# Patient Record
Sex: MALE | Race: WHITE | NOT HISPANIC OR LATINO | Employment: OTHER | URBAN - METROPOLITAN AREA
[De-identification: names, ages, dates, MRNs, and addresses within clinical notes are randomized per-mention and may not be internally consistent; named-entity substitution may affect disease eponyms.]

---

## 2023-10-13 PROBLEM — J32.9 RHINOSINUSITIS: Status: ACTIVE | Noted: 2023-10-13

## 2023-10-13 PROBLEM — J45.909 ASTHMA (HHS-HCC): Status: ACTIVE | Noted: 2023-10-13

## 2023-10-13 PROBLEM — I80.02 THROMBOPHLEBITIS OF SUPERFICIAL VEINS OF LEFT LOWER EXTREMITY: Status: ACTIVE | Noted: 2023-10-13

## 2023-10-13 PROBLEM — R22.9 SUBCUTANEOUS NODULES: Status: ACTIVE | Noted: 2023-10-13

## 2023-10-13 PROBLEM — J45.20 MILD INTERMITTENT ASTHMA WITHOUT COMPLICATION (HHS-HCC): Status: ACTIVE | Noted: 2023-10-13

## 2023-10-13 PROBLEM — E78.5 DYSLIPIDEMIA: Status: ACTIVE | Noted: 2023-10-13

## 2023-10-13 PROBLEM — L82.1 SEBORRHEIC KERATOSES: Status: ACTIVE | Noted: 2023-10-13

## 2023-10-13 PROBLEM — I83.93 VARICOSE VEINS OF LEGS: Status: ACTIVE | Noted: 2023-10-13

## 2023-10-13 PROBLEM — R35.0 URINARY FREQUENCY: Status: ACTIVE | Noted: 2023-10-13

## 2023-10-13 PROBLEM — N40.0 BPH (BENIGN PROSTATIC HYPERPLASIA): Status: ACTIVE | Noted: 2023-10-13

## 2023-10-13 PROBLEM — E55.9 VITAMIN D DEFICIENCY: Status: ACTIVE | Noted: 2023-10-13

## 2023-10-13 PROBLEM — N45.1 EPIDIDYMITIS: Status: ACTIVE | Noted: 2023-10-13

## 2023-10-13 PROBLEM — N52.9 ERECTILE DYSFUNCTION: Status: ACTIVE | Noted: 2023-10-13

## 2023-10-13 PROBLEM — R93.1 AGATSTON CORONARY ARTERY CALCIUM SCORE BETWEEN 200 AND 399: Status: ACTIVE | Noted: 2023-10-13

## 2023-10-13 RX ORDER — MULTIVIT-MINS/IRON/FOLIC/LYCOP 8-200-600
TABLET ORAL
COMMUNITY
Start: 2019-04-02

## 2023-10-13 RX ORDER — MOMETASONE FUROATE 50 UG/1
SPRAY, METERED NASAL
COMMUNITY

## 2023-10-13 RX ORDER — NAPROXEN 500 MG/1
1 TABLET ORAL 2 TIMES DAILY PRN
COMMUNITY
Start: 2022-05-23 | End: 2023-11-16 | Stop reason: ALTCHOICE

## 2023-10-13 RX ORDER — SENNOSIDES 8.6 MG/1
1 TABLET ORAL DAILY
COMMUNITY
Start: 2021-04-22 | End: 2023-11-16 | Stop reason: ALTCHOICE

## 2023-10-13 RX ORDER — CYCLOBENZAPRINE HCL 5 MG
1-2 TABLET ORAL EVERY 8 HOURS PRN
COMMUNITY
Start: 2022-05-23 | End: 2023-11-16 | Stop reason: ALTCHOICE

## 2023-10-13 RX ORDER — TADALAFIL 10 MG/1
1 TABLET ORAL AS NEEDED
COMMUNITY
Start: 2022-09-14 | End: 2023-10-16 | Stop reason: SDUPTHER

## 2023-10-16 ENCOUNTER — LAB (OUTPATIENT)
Dept: LAB | Facility: LAB | Age: 58
End: 2023-10-16
Payer: COMMERCIAL

## 2023-10-16 ENCOUNTER — OFFICE VISIT (OUTPATIENT)
Dept: UROLOGY | Facility: CLINIC | Age: 58
End: 2023-10-16
Payer: COMMERCIAL

## 2023-10-16 VITALS — HEIGHT: 72 IN | WEIGHT: 266.6 LBS | BODY MASS INDEX: 36.11 KG/M2 | TEMPERATURE: 97.3 F

## 2023-10-16 DIAGNOSIS — N40.0 BENIGN PROSTATIC HYPERPLASIA, UNSPECIFIED WHETHER LOWER URINARY TRACT SYMPTOMS PRESENT: ICD-10-CM

## 2023-10-16 DIAGNOSIS — N40.1 BPH WITH OBSTRUCTION/LOWER URINARY TRACT SYMPTOMS: Primary | ICD-10-CM

## 2023-10-16 DIAGNOSIS — N13.8 BPH WITH OBSTRUCTION/LOWER URINARY TRACT SYMPTOMS: ICD-10-CM

## 2023-10-16 DIAGNOSIS — N13.8 BPH WITH OBSTRUCTION/LOWER URINARY TRACT SYMPTOMS: Primary | ICD-10-CM

## 2023-10-16 DIAGNOSIS — N40.1 BPH WITH OBSTRUCTION/LOWER URINARY TRACT SYMPTOMS: ICD-10-CM

## 2023-10-16 LAB — PSA SERPL-MCNC: 0.44 NG/ML

## 2023-10-16 PROCEDURE — 84153 ASSAY OF PSA TOTAL: CPT

## 2023-10-16 PROCEDURE — 36415 COLL VENOUS BLD VENIPUNCTURE: CPT

## 2023-10-16 PROCEDURE — 99214 OFFICE O/P EST MOD 30 MIN: CPT | Performed by: UROLOGY

## 2023-10-16 PROCEDURE — 51741 ELECTRO-UROFLOWMETRY FIRST: CPT | Performed by: UROLOGY

## 2023-10-16 PROCEDURE — 51798 US URINE CAPACITY MEASURE: CPT | Performed by: UROLOGY

## 2023-10-16 RX ORDER — TADALAFIL 10 MG/1
10 TABLET ORAL AS NEEDED
Qty: 30 TABLET | Refills: 3 | Status: SHIPPED | OUTPATIENT
Start: 2023-10-16 | End: 2024-10-15

## 2023-10-16 NOTE — PROGRESS NOTES
Subjective   Dar Lee is a 58 y.o. with history of  history of erectile dysfunction on Tadalafil and BPH s/p PVP 04/22/21who presents today for follow up visit. Patient has no new urinary complaints. He is very satisfied with his urinary outcome s/p PVP. He denies flank pain, gross hematuria, kidney stones, and recurrent UTI.    Last PSA 0.5  Objective   Past Medical History:   Diagnosis Date    Body mass index (BMI) 39.0-39.9, adult 01/27/2020    Body mass index (BMI) of 39.0 to 39.9 in adult    Essential (primary) hypertension 04/02/2019    Benign essential hypertension    Nondisplaced fracture of head of left radius, subsequent encounter for closed fracture with routine healing 05/26/2016    Closed nondisplaced fracture of head of left radius with routine healing    Other conditions influencing health status     Prostatic Hyperplasia Without Urinary Obstruction With Other Lower Urinary Tract Symptoms    Pain in left elbow 05/26/2016    Left elbow pain     Past Surgical History:   Procedure Laterality Date    HEMORRHOID SURGERY  12/19/2013    Hemorrhoidectomy    OTHER SURGICAL HISTORY  12/19/2013    Excision Of Soft Tissue Tumor Of Back Subcutaneous 3cm Or Greater     Current Outpatient Medications on File Prior to Visit   Medication Sig Dispense Refill    mometasone (Nasonex) 50 mcg/actuation nasal spray Administer into affected nostril(s).      mv-min-folic-K1-lycopen-lutein (Men 50 Plus Multivitamin) 687--300 mcg tablet Take by mouth.      [DISCONTINUED] tadalafil (Cialis) 10 mg tablet Take 1 tablet (10 mg) by mouth if needed.      cyclobenzaprine (Flexeril) 5 mg tablet Take 1-2 tablets (5-10 mg) by mouth every 8 hours if needed.      naproxen (Naprosyn) 500 mg tablet Take 1 tablet (500 mg) by mouth 2 times a day as needed.      sennosides (Senokot) 8.6 mg tablet Take 1 tablet (8.6 mg) by mouth once daily.       No current facility-administered medications on file prior to visit.     No Known  Allergies    Temp 36.3 °C (97.3 °F) (Temporal)   Ht 1.829 m (6')   Wt 121 kg (266 lb 9.6 oz)   BMI 36.16 kg/m²   Physical Exam    Lab Review  Urine analysis shows negative Micro exam: clear  Lab Results   Component Value Date    PSA 0.53 09/14/2022     IPSS 0 and 0  PVR 0mL  Uroflow study demonstrated voided volume of 31 and maximal flow rate of 7 ml/s.     Assessment/Plan     1. BPH s/p PVP 04/22/2021    The patient is satisfied with his urinary outcome, we will follow up in 1 year or sooner if needed.       2. PSA monitoring     We will obtain a PSA blood test now and again in 1 year for continued monitoring.       3. ED     We will refill Tadalafil 10mg for 1 year.       Diagnoses and all orders for this visit:  BPH with obstruction/lower urinary tract symptoms  -     Urine flow measurement; Future  -     Prostate Specific Antigen; Future  -     Prostate Specific Antigen; Future  -     tadalafil (Cialis) 10 mg tablet; Take 1 tablet (10 mg) by mouth if needed for erectile dysfunction (Take 1 tablet 1 hour prior to activity as needed).  Benign prostatic hyperplasia, unspecified whether lower urinary tract symptoms present  -     Measure post void residual  -     tadalafil (Cialis) 10 mg tablet; Take 1 tablet (10 mg) by mouth if needed for erectile dysfunction (Take 1 tablet 1 hour prior to activity as needed).    All questions were answered to the patient's satisfaction. Patient agrees with the plan and wishes to proceed. Follow-up will be scheduled appropriately.       Scribed for Dr. Saldana by Yue Champion. I , Dr Saldana, have personally reviewed and agreed with the information entered by the Virtual Scribe.

## 2023-11-16 ENCOUNTER — OFFICE VISIT (OUTPATIENT)
Dept: PRIMARY CARE | Facility: CLINIC | Age: 58
End: 2023-11-16
Payer: COMMERCIAL

## 2023-11-16 VITALS
OXYGEN SATURATION: 95 % | HEART RATE: 90 BPM | BODY MASS INDEX: 36.48 KG/M2 | WEIGHT: 269 LBS | DIASTOLIC BLOOD PRESSURE: 78 MMHG | SYSTOLIC BLOOD PRESSURE: 128 MMHG

## 2023-11-16 DIAGNOSIS — Z13.1 SCREENING FOR DIABETES MELLITUS: ICD-10-CM

## 2023-11-16 DIAGNOSIS — R93.1 AGATSTON CORONARY ARTERY CALCIUM SCORE BETWEEN 200 AND 399: ICD-10-CM

## 2023-11-16 DIAGNOSIS — E55.9 VITAMIN D DEFICIENCY: ICD-10-CM

## 2023-11-16 DIAGNOSIS — K58.0 IRRITABLE BOWEL SYNDROME WITH DIARRHEA: Primary | ICD-10-CM

## 2023-11-16 DIAGNOSIS — M54.16 LUMBAR RADICULOPATHY: ICD-10-CM

## 2023-11-16 PROBLEM — N45.1 EPIDIDYMITIS: Status: RESOLVED | Noted: 2023-10-13 | Resolved: 2023-11-16

## 2023-11-16 PROBLEM — J32.9 RHINOSINUSITIS: Status: RESOLVED | Noted: 2023-10-13 | Resolved: 2023-11-16

## 2023-11-16 PROBLEM — J45.909 ASTHMA (HHS-HCC): Status: RESOLVED | Noted: 2023-10-13 | Resolved: 2023-11-16

## 2023-11-16 PROBLEM — K63.5 COLON POLYPS: Status: ACTIVE | Noted: 2023-11-16

## 2023-11-16 LAB
ALBUMIN SERPL BCP-MCNC: 4.5 G/DL (ref 3.4–5)
ALP SERPL-CCNC: 66 U/L (ref 33–120)
ALT SERPL W P-5'-P-CCNC: 39 U/L (ref 10–52)
ANION GAP SERPL CALC-SCNC: 13 MMOL/L (ref 10–20)
AST SERPL W P-5'-P-CCNC: 24 U/L (ref 9–39)
BASOPHILS # BLD AUTO: 0.04 X10*3/UL (ref 0–0.1)
BASOPHILS NFR BLD AUTO: 0.7 %
BILIRUB SERPL-MCNC: 0.4 MG/DL (ref 0–1.2)
BUN SERPL-MCNC: 19 MG/DL (ref 6–23)
CALCIUM SERPL-MCNC: 9.7 MG/DL (ref 8.6–10.3)
CHLORIDE SERPL-SCNC: 102 MMOL/L (ref 98–107)
CHOLEST SERPL-MCNC: 193 MG/DL (ref 0–199)
CHOLESTEROL/HDL RATIO: 5.6
CO2 SERPL-SCNC: 31 MMOL/L (ref 21–32)
CREAT SERPL-MCNC: 0.75 MG/DL (ref 0.5–1.3)
EOSINOPHIL # BLD AUTO: 0.17 X10*3/UL (ref 0–0.7)
EOSINOPHIL NFR BLD AUTO: 3 %
ERYTHROCYTE [DISTWIDTH] IN BLOOD BY AUTOMATED COUNT: 12.6 % (ref 11.5–14.5)
GFR SERPL CREATININE-BSD FRML MDRD: >90 ML/MIN/1.73M*2
GLUCOSE SERPL-MCNC: 99 MG/DL (ref 74–99)
HCT VFR BLD AUTO: 45.6 % (ref 41–52)
HDLC SERPL-MCNC: 34.5 MG/DL
HGB BLD-MCNC: 14.7 G/DL (ref 13.5–17.5)
IMM GRANULOCYTES # BLD AUTO: 0.02 X10*3/UL (ref 0–0.7)
IMM GRANULOCYTES NFR BLD AUTO: 0.4 % (ref 0–0.9)
LDLC SERPL CALC-MCNC: 127 MG/DL
LYMPHOCYTES # BLD AUTO: 1.16 X10*3/UL (ref 1.2–4.8)
LYMPHOCYTES NFR BLD AUTO: 20.8 %
MCH RBC QN AUTO: 28.5 PG (ref 26–34)
MCHC RBC AUTO-ENTMCNC: 32.2 G/DL (ref 32–36)
MCV RBC AUTO: 89 FL (ref 80–100)
MONOCYTES # BLD AUTO: 0.42 X10*3/UL (ref 0.1–1)
MONOCYTES NFR BLD AUTO: 7.5 %
NEUTROPHILS # BLD AUTO: 3.78 X10*3/UL (ref 1.2–7.7)
NEUTROPHILS NFR BLD AUTO: 67.6 %
NON HDL CHOLESTEROL: 159 MG/DL (ref 0–149)
NRBC BLD-RTO: 0 /100 WBCS (ref 0–0)
PLATELET # BLD AUTO: 254 X10*3/UL (ref 150–450)
POTASSIUM SERPL-SCNC: 4.3 MMOL/L (ref 3.5–5.3)
PROT SERPL-MCNC: 6.6 G/DL (ref 6.4–8.2)
RBC # BLD AUTO: 5.15 X10*6/UL (ref 4.5–5.9)
SODIUM SERPL-SCNC: 142 MMOL/L (ref 136–145)
TRIGL SERPL-MCNC: 160 MG/DL (ref 0–149)
TSH SERPL-ACNC: 1.63 MIU/L (ref 0.44–3.98)
VLDL: 32 MG/DL (ref 0–40)
WBC # BLD AUTO: 5.6 X10*3/UL (ref 4.4–11.3)

## 2023-11-16 PROCEDURE — 85025 COMPLETE CBC W/AUTO DIFF WBC: CPT

## 2023-11-16 PROCEDURE — 84443 ASSAY THYROID STIM HORMONE: CPT

## 2023-11-16 PROCEDURE — 82306 VITAMIN D 25 HYDROXY: CPT

## 2023-11-16 PROCEDURE — 36415 COLL VENOUS BLD VENIPUNCTURE: CPT

## 2023-11-16 PROCEDURE — 86431 RHEUMATOID FACTOR QUANT: CPT

## 2023-11-16 PROCEDURE — 1036F TOBACCO NON-USER: CPT | Performed by: FAMILY MEDICINE

## 2023-11-16 PROCEDURE — 80061 LIPID PANEL: CPT

## 2023-11-16 PROCEDURE — 99214 OFFICE O/P EST MOD 30 MIN: CPT | Performed by: FAMILY MEDICINE

## 2023-11-16 PROCEDURE — 83036 HEMOGLOBIN GLYCOSYLATED A1C: CPT

## 2023-11-16 PROCEDURE — 80053 COMPREHEN METABOLIC PANEL: CPT

## 2023-11-16 RX ORDER — ALBUTEROL SULFATE 90 UG/1
2 AEROSOL, METERED RESPIRATORY (INHALATION) EVERY 6 HOURS PRN
COMMUNITY

## 2023-11-16 RX ORDER — MOMETASONE FUROATE 50 UG/1
2 SPRAY, METERED NASAL DAILY
COMMUNITY
End: 2023-11-16 | Stop reason: SDUPTHER

## 2023-11-16 RX ORDER — CYCLOBENZAPRINE HCL 5 MG
5-10 TABLET ORAL EVERY 8 HOURS PRN
Qty: 60 TABLET | Refills: 2 | Status: SHIPPED | OUTPATIENT
Start: 2023-11-16

## 2023-11-16 RX ORDER — CHOLECALCIFEROL (VITAMIN D3) 25 MCG
1000 TABLET ORAL DAILY
COMMUNITY

## 2023-11-16 RX ORDER — NAPROXEN 500 MG/1
500 TABLET ORAL 2 TIMES DAILY PRN
Qty: 60 TABLET | Refills: 3 | Status: SHIPPED | OUTPATIENT
Start: 2023-11-16

## 2023-11-16 NOTE — PATIENT INSTRUCTIONS
"Take Enteric Coated 81 mg Aspirin daily - after breakfast -     The Naproxen for pain -  can be taken twice a day as needed - after food -   But has to be taken a few hours after the aspirin     Can take the cyclobenzaprine 5 mg -  up to three times a day for muscle spasm   - can make you tired      Consider follow up with ortho       Call [phone] to set up CT scan   If the number is moderate or high -  I will be sending in the RX for the cholesterol medication     I will mail you out your test results - if you do not hear from anyone in 2 weeks - call me .     [facility]  -  [phone] [facility] -  Cheryl     For General Healthy Nutrition    (Remember - NOT A DIET!   Diets are only good for class reunions.)    These are my general good nutrition recommendations for most people.   I use the term \" diet \"  in these instructions to mean your overall nutrition - how you eat and drink.   If we talked about something different during your visit with me,  other than what is written below,  follow that advice instead.       For most people,  eating healthier means getting less added sugar and less processed foods in your diet    The fresher the better.    Added sugar is now a part of the nutrition label on manufactured food, so you can keep an eye on it easier.    But basically,  foods and beverages  that contain regular sugar and corn syrup are the main sources of added sugars.  Eating as little of these foods as you can is best.   One shocking example of the epidemic of added sugar is soda.    One can of regular soda contains about as much added sugar as 3 regular size doughnuts!     The other issue with processed foods is the amount of processed grains they contain , such as white flour.    This is also something you want to try to limit in your diet.     But, grain products are very important for your nutrition.    Whole grains are better for your body.     Cutting back on white breads, traditional pasta, baked goods, " white rice,  and processed cereals will be healthier for you.   The better choices include whole grain breads,  whole wheat pasta,  brown rice, quinoa, barley, steel cut  or rolled oats.   If you eat cereal for breakfast, try to look for one made with whole grains and less sugar.   There are many people who have a problem with gluten, for a large variety of reasons.    Generally,  products made with wheat flour , barley or rye are the primary source of gluten.       Cutting back on saturated fats is important.    You want the majority of the meat that you eat to be chicken, fish or turkey.   Baked or broiled is best -  fried adds too much fat.    There are healthy fats that are important - fat is important for holding down appetite, vitamin absorption and several metabolic processes in the body.  Monounsaturated fats raise HDL (good cholesterol) and lower LDL (bad cholesterol).   Olive oil, peanut oil, nuts, seeds, and avocados are great sources of the good fats.       Ideas are:   Trade sour cream dip for hummus (which is rich in olive oil) or guacamole; use veggies or whole-wheat chips to dip.    Nuts are an excellent source of protein and healthy fats.   Tree nuts are the best kind, such as almonds or walnuts.   Just be careful - they are high in calories, so stick to a serving size.  (Most are about 200 calories for a 1/4 cup)      Proteins are very important for your body, and they also hold down your appetite.   Try to have protein with every meal.    These generally are meats, nuts, many beans, legumes, eggs, and dairy.   You will find protein in whole grain products and some green vegetables have a little too.     When you have dairy (if you can - many people are lactose intolerant) try to make it low fat.    Ideas are 1% milk, lowfat yogurt or cheeses, low fat cottage cheese.   I don't generally recommend FAT FREE because they often contain artificial products to improve taste, and the fat helps hold down  "your appetite.   If you are lactose intolerant, try to see if taking Lactaid before having dairy helps.      Fresh fruits and vegetables are VERY important.  The brighter the better.   Many vegetables are considered \"Free Foods\" - meaning you can eat as much as you want, and it does not matter.  These include tomatoes, cucumbers, celery, peppers, all the various lettuces and kale - to name a few.   Potatoes, corn and peas are starchy, so do have more calories, but are still healthy - you just want to watch the amount of them you eat.       Fruits are full of wonderful nutrition.   They contain natural sugar called fructose, so eating them in moderation is best.   Diabetics may need to pay careful attention to how their body reacts to the sugar.  Some fruits might drastically increase their blood sugar.      Eating smaller meals with a couple of small snacks is better for your metabolism than not eating for long amounts of time  (breakfast is very important).   Trying to avoid large meals is helpful too.    Eating like this helps keep your appetite down and keeps you in burning metabolism rather than storage metabolism so your body will use the calories you eat.       I do not tell people to stop eating sweets or snack foods - just limit the amounts you have.  The less the better.   Pay attention to serving sizes, and treat them as a treat.        Foods like doughnuts, pop tarts, sugar cereals, cookies  ARE NOT GOOD FOR BREAKFAST.   They are loaded with sugar and will cause you to be hungrier in the day and often not feel well.    Caffeine needs to be limited - no more than 2 servings a day.  Some people can't have any at all.    (if you have any sleep or anxiety issues - stop the caffeine)   Coffee, many teas, many sodas, energy drinks, almost any diet supplement,  and chocolate all contain caffeine.      Water is important.   For most people, 8   x  8 ounces  a day are needed.  This may vary for some health " issues.    If you need to be on a low sodium diet, that means looking at labels and eating only 1000 - 2000 mg of sodium a day.    Calcium intake is important.  3 servings of a high calcium food or drink a day is recommended.   This is usually a cup of milk, a cup of yogurt, an ounce of a hard cheese or 1.5 ounces of a soft cheese are the usual servings.   There are other high calcium foods - including soy or almond milk, broccoli,  almonds, dark green leafy vegetable.   Make sure you are not getting more than 1000  - 1200 mg of total calcium a day (unless you have been told you need more by a doctor).    Vitamin D 3 is important to absorb the calcium and for your immune system.   For children, 400 IU a day is recommended.   For adults - 800 - 5000 IU a day  is recommended.  (Often the amount needed is individualized for adults - be sure to ask how much is right for you)    Physical activity is very important for good health.    Finding activities that give you regular exercise is very important for good health.  Try to find exercise you enjoy doing on a regular basis.    30 minutes at least 5 days a week of a good cardiovascular exercise is recommended.   That means something that gets your heart rate going faster than your usual baseline and you can find yourself breathing harder than usual while you are exercising.  If you have not done any exercise in a long time,  make sure you ask if its safe for you to start,  and be sure to gradually work up to your goal.      If you need to lose weight,  following these recommendations will help you.   And if you are doing all of this and still not losing weight, then its likely just the amount of food you are eating.   Learn to cut back on portion sizes.  Using smaller plates may help.  Healthy weight loss is  only about a pound a week.   You have to remember that whatever you do to lose the weight, you must be prepared to keep it up for life for the weight to stay off.      A lot of people have a lot of luck with using something like a fit bit,  or a program where you keep track of all of your calories that you eat and what you burn off in the day.

## 2023-11-16 NOTE — PROGRESS NOTES
Subjective   Patient ID: Dar Lee is a 58 y.o. male who presents for Follow-up (Wants to discuss getting some blood work done.).    HPI     LOV over a year ago -   A few issues to go over       Colon polyps -  3/2023 last scope -   Tubular adenoma     When he is nervous - has several BM s   They are fine if he is not nervous   3 - 4 days a week     Does tend to anxiety  - tries not to worry   Caffeine - 6 cups a day  (cutting back)       BPH /ED -  sees DR Saldana yearly   Prostate surgery 4/2021  (BPV)  No meds -   Last PSA less than one   Takes tadalafil prn only   No issues urinating     Having a lot of back pain   Seeing [facility] -   Had Xray and EMG study - that did show lumbar nerve compressions -   Did not go back to the doctor   Tried their DRX machine -   Has his own inversion table - loves how it makes him feel   Very stiff in the AM -  1.5 hours to loosen up   He does have exercises - those help him to feel better  Would like medications to help too  Had naproxen and cyclobenz in the past - not sure what happened with them     Right elbow pain and swelling about 6 months   - does to repetative work      Hx of Moderately high Cor CT score  5/2018 -   Never took the cholesterol medication   Not taking ASA either   No hx of MI or CVA     Sees an herbalist in [place]  - wants labs done     Use to see John years ago -   Wonders about RA    Many skin tags to remove     Declines vaccines       Review of Systems    Objective   /78 (BP Location: Right arm, Patient Position: Sitting, BP Cuff Size: Large adult)   Pulse 90   Wt 122 kg (269 lb)   SpO2 95%   BMI 36.48 kg/m²     Physical Exam  Vitals reviewed.   Constitutional:       General: He is not in acute distress.     Appearance: Normal appearance. He is obese. He is not ill-appearing, toxic-appearing or diaphoretic.   HENT:      Head: Normocephalic and atraumatic.      Right Ear: External ear normal.      Left Ear: External ear normal.       Nose: Nose normal.      Mouth/Throat:      Mouth: Mucous membranes are moist.      Pharynx: No posterior oropharyngeal erythema.   Eyes:      General: No scleral icterus.     Extraocular Movements: Extraocular movements intact.      Pupils: Pupils are equal, round, and reactive to light.   Cardiovascular:      Rate and Rhythm: Normal rate and regular rhythm.      Pulses: Normal pulses.      Heart sounds: No murmur heard.  Pulmonary:      Effort: Pulmonary effort is normal. No respiratory distress.      Breath sounds: Normal breath sounds. No wheezing.   Abdominal:      General: Bowel sounds are normal. There is no distension.      Palpations: Abdomen is soft.      Tenderness: There is no abdominal tenderness.   Musculoskeletal:         General: Normal range of motion.      Cervical back: Neck supple. No rigidity.      Right lower leg: No edema.      Left lower leg: No edema.      Comments: Tender over right lateral epicondyle    Lymphadenopathy:      Cervical: No cervical adenopathy.   Skin:     General: Skin is warm and dry.      Findings: No rash.      Comments: Several skin tags around neck and upper chest    Neurological:      General: No focal deficit present.      Mental Status: He is alert and oriented to person, place, and time.   Psychiatric:         Mood and Affect: Mood normal.         Thought Content: Thought content normal.         Assessment/Plan   Problem List Items Addressed This Visit             ICD-10-CM       High    Agatston coronary artery calcium score between 200 and 399 R93.1    Relevant Orders    CT cardiac scoring wo IV contrast    Comprehensive Metabolic Panel    Lipid Panel    CBC and Auto Differential    TSH with reflex to Free T4 if abnormal    Irritable bowel syndrome with diarrhea - Primary K58.0       Medium    Vitamin D deficiency E55.9    Relevant Orders    Vitamin D 25-Hydroxy,Total (for eval of Vitamin D levels)    Lumbar radiculopathy M54.16    Relevant Medications     cyclobenzaprine (Flexeril) 5 mg tablet    naproxen (Naprosyn) 500 mg tablet    Other Relevant Orders    Rheumatoid Factor     Other Visit Diagnoses         Codes    Screening for diabetes mellitus     Z13.1    Relevant Orders    Hemoglobin A1C          58 year old man  - issues as above    Discussed the likely IBS-D -   Discussed possible paroxetine to help -   He wants to first try less coffee    Discussed his back issues -   Urged to follow up with ortho -   And refilled the naproxen and cyclobenz -   But told to take asa first in the AM and always take the ASA and naproxen with food     Elbow seems to be epicondylitis     Moderate Cor Score on CT - agreed to check again     Labs today     Urged seeing derm for skin tags     We discussed at visit any disease processes that were of concern as well as the risks, benefits and instructions of any new medication provided.    See orders and discussion section for information handed to patient on their Clinical Summary.   Patient (and/or caretaker of patient if present)  stated all questions were answered, and they voiced understanding of instructions.

## 2023-11-17 LAB
25(OH)D3 SERPL-MCNC: 75 NG/ML (ref 30–100)
EST. AVERAGE GLUCOSE BLD GHB EST-MCNC: 114 MG/DL
HBA1C MFR BLD: 5.6 %
RHEUMATOID FACT SER NEPH-ACNC: <10 IU/ML (ref 0–15)

## 2023-11-20 ENCOUNTER — TELEPHONE (OUTPATIENT)
Dept: PRIMARY CARE | Facility: CLINIC | Age: 58
End: 2023-11-20
Payer: COMMERCIAL

## 2024-05-07 ENCOUNTER — TELEPHONE (OUTPATIENT)
Dept: PRIMARY CARE | Facility: CLINIC | Age: 59
End: 2024-05-07
Payer: COMMERCIAL

## 2024-05-07 NOTE — TELEPHONE ENCOUNTER
Patient is asking for something for pain. He was wondering about Gabapentin    S/w Jennifer Alan, stated that pts BP was 92/60, he is tired and weak with hematuria  Jennifer Alan states that the blood is a dark holly red color  Stated that his SP02 and HR is fluctuating from Spo2 84% HR 50- SP02 100%  Advised to go to the ER  Verbally understood

## 2024-08-22 ENCOUNTER — APPOINTMENT (OUTPATIENT)
Dept: PRIMARY CARE | Facility: CLINIC | Age: 59
End: 2024-08-22
Payer: COMMERCIAL

## 2024-08-22 VITALS
BODY MASS INDEX: 36.89 KG/M2 | OXYGEN SATURATION: 99 % | SYSTOLIC BLOOD PRESSURE: 155 MMHG | HEART RATE: 89 BPM | WEIGHT: 272 LBS | DIASTOLIC BLOOD PRESSURE: 93 MMHG

## 2024-08-22 DIAGNOSIS — R93.1 AGATSTON CORONARY ARTERY CALCIUM SCORE BETWEEN 200 AND 399: ICD-10-CM

## 2024-08-22 DIAGNOSIS — L05.91 PILONIDAL CYST: Primary | ICD-10-CM

## 2024-08-22 PROCEDURE — 1036F TOBACCO NON-USER: CPT

## 2024-08-22 PROCEDURE — 99213 OFFICE O/P EST LOW 20 MIN: CPT

## 2024-08-22 RX ORDER — SULFAMETHOXAZOLE AND TRIMETHOPRIM 800; 160 MG/1; MG/1
1 TABLET ORAL 2 TIMES DAILY
Qty: 20 TABLET | Refills: 0 | Status: SHIPPED | OUTPATIENT
Start: 2024-08-22 | End: 2024-09-01

## 2024-08-22 ASSESSMENT — PATIENT HEALTH QUESTIONNAIRE - PHQ9
SUM OF ALL RESPONSES TO PHQ9 QUESTIONS 1 AND 2: 0
2. FEELING DOWN, DEPRESSED OR HOPELESS: NOT AT ALL
1. LITTLE INTEREST OR PLEASURE IN DOING THINGS: NOT AT ALL

## 2024-08-22 NOTE — PROGRESS NOTES
Subjective   Patient ID: Dar Lee is a 58 y.o. male who presents for possible pilonidal cyst on tailbone (Per pt cyst is draining, needs order for CT cardiac score test).  HPI  Dar presents for a cyst on his tailbone   He states that it has been there for over a month, it has been draining for that long as well   He has tried to put a salve on it, it has not helped  He states there is a little opening that the drainage comes out of, the drainage is a reddish color  He states that it does not hurt but he can feel it when he is sitting     He states that several years ago, his wife noticed a little cyst with a hole in the same area and she actually pulled out hair follicles from the area    Has irritable bowel syndrome   Will take 1-2 anti-diarrheals when needed, this is just a couple times a month, not weekly      Past Surgical History:   Procedure Laterality Date    HEMORRHOID SURGERY  12/19/2013    Hemorrhoidectomy    OTHER SURGICAL HISTORY  12/19/2013    Excision Of Soft Tissue Tumor Of Back Subcutaneous 3cm Or Greater      Past Medical History:   Diagnosis Date    Body mass index (BMI) 39.0-39.9, adult 01/27/2020    Body mass index (BMI) of 39.0 to 39.9 in adult    Essential (primary) hypertension 04/02/2019    Benign essential hypertension    Nondisplaced fracture of head of left radius, subsequent encounter for closed fracture with routine healing 05/26/2016    Closed nondisplaced fracture of head of left radius with routine healing    Other conditions influencing health status     Prostatic Hyperplasia Without Urinary Obstruction With Other Lower Urinary Tract Symptoms    Pain in left elbow 05/26/2016    Left elbow pain     Social History     Tobacco Use    Smoking status: Former     Types: Cigarettes    Smokeless tobacco: Never   Vaping Use    Vaping status: Never Used   Substance Use Topics    Alcohol use: Never    Drug use: Never        Review of Systems  10 point review of systems performed and is  negative except as noted in the HPI.      Current Outpatient Medications:     cholecalciferol (Vitamin D3) 25 MCG (1000 UT) tablet, Take 1 tablet (1,000 Units) by mouth once daily., Disp: , Rfl:     cyclobenzaprine (Flexeril) 5 mg tablet, Take 1-2 tablets (5-10 mg) by mouth every 8 hours if needed for muscle spasms., Disp: 60 tablet, Rfl: 2    mometasone (Nasonex) 50 mcg/actuation nasal spray, Administer into affected nostril(s)., Disp: , Rfl:     naproxen (Naprosyn) 500 mg tablet, Take 1 tablet (500 mg) by mouth 2 times a day as needed for moderate pain (4 - 6)., Disp: 60 tablet, Rfl: 3    tadalafil (Cialis) 10 mg tablet, Take 1 tablet (10 mg) by mouth if needed for erectile dysfunction (Take 1 tablet 1 hour prior to activity as needed)., Disp: 30 tablet, Rfl: 3    albuterol 90 mcg/actuation inhaler, Inhale 2 puffs every 6 hours if needed for wheezing., Disp: , Rfl:     mv-min-folic-K1-lycopen-lutein (Men 50 Plus Multivitamin) 841--300 mcg tablet, Take by mouth., Disp: , Rfl:     sulfamethoxazole-trimethoprim (Bactrim DS) 800-160 mg tablet, Take 1 tablet by mouth 2 times a day for 10 days., Disp: 20 tablet, Rfl: 0     Objective   BP (!) 155/93   Pulse 89   Wt 123 kg (272 lb)   SpO2 99%   BMI 36.89 kg/m²     Physical Exam  Constitutional:       Appearance: Normal appearance. He is obese.   HENT:      Head: Normocephalic and atraumatic.   Skin:     General: Skin is warm and dry.      Comments: Within the gluteal cleft is a small fluctuant mass. No drainage is expressed from the area. Mild erythema surrounding the area. Multiple pores seen in the area.    Neurological:      Mental Status: He is alert.         Assessment/Plan   Problem List Items Addressed This Visit       Agatston coronary artery calcium score between 200 and 399    Relevant Orders    CT cardiac scoring wo IV contrast     Other Visit Diagnoses       Pilonidal cyst    -  Primary    Relevant Medications    sulfamethoxazole-trimethoprim  (Bactrim DS) 800-160 mg tablet    Other Relevant Orders    Referral to General Surgery          Pilonidal cyst   Referral to general surgery   Start bactrim due to drainage color  Case discussed with Dr. Trejo     Discussed at visit any disease processes that were of concern as well as the risks, benefits and instructions on any new medication provided. Patient (and/or caretaker of patient if present) stated all questions were answered, and they voiced understanding of instructions.

## 2024-08-22 NOTE — PATIENT INSTRUCTIONS
[facility]     Dr Braun - [phone]    Dr Ayala - [phone]    Start bactrim twice a day for 10 days

## 2024-08-28 ENCOUNTER — APPOINTMENT (OUTPATIENT)
Dept: SURGERY | Facility: CLINIC | Age: 59
End: 2024-08-28
Payer: COMMERCIAL

## 2024-08-28 VITALS
DIASTOLIC BLOOD PRESSURE: 93 MMHG | BODY MASS INDEX: 36.55 KG/M2 | HEART RATE: 101 BPM | SYSTOLIC BLOOD PRESSURE: 151 MMHG | OXYGEN SATURATION: 96 % | WEIGHT: 269.5 LBS

## 2024-08-28 DIAGNOSIS — L05.91 PILONIDAL CYST: Primary | ICD-10-CM

## 2024-08-28 DIAGNOSIS — L05.91 PILONIDAL CYST: ICD-10-CM

## 2024-08-28 PROCEDURE — 99203 OFFICE O/P NEW LOW 30 MIN: CPT | Performed by: SURGERY

## 2024-08-28 NOTE — PROGRESS NOTES
General Surgery History and Physical    Referring Provider:  MD Mark Brush Marina, PA-C     Chief Complaint:  Pilonidal cyst    History of Present Illness:  This is a 58 y.o. male who presents with a pilonidal cyst.  He reports that for 2 years he has had intermittent draining from his gluteal cleft.  He reports that intermittently his wife will help him drain the cyst and pull out strands of hair.  He denies any prior infections.   He denies any significant episodes of pain.    Past Medical History:  Obesity    Past Surgical History:  Hemorrhoidectomy  Subcutaneous cyst excision  Colonoscopy    Medications:  Albuterol  Flexeril  Naproxen  Bactrim  Cialis    Allergies:  No known drug allergies    Family History:  Asthma    Social History:  .  He is [place].  He works in a [employer] business.  Denies alcohol and illicits.  Former smoker.       Review of Systems:  A complete 12 point review of systems was performed and is negative except as noted in the history of present illness.      Vital Signs:  Vitals:    08/28/24 0837   BP: (!) 151/93   Pulse: 101   SpO2: 96%          Physical Exam:  General: No acute distress. Sitting up in bed.   Neuro: Alert and oriented ×3. Follows commands.  Head: Atraumatic  Eyes: Pupils equal reactive to light. Extraocular motions intact.  Ears: Hears normal speaking voice.  Mouth, Nose, Throat: Mucous membranes moist.  Normal dentition.  Neck: Supple. No appreciable masses.  Chest: No crepitus.  No appreciable scars.  Heart: Regular rate and rhythm.  Lung: Clear to auscultation bilaterally.  Vascular: No carotid bruits.  Palpable radial pulses bilaterally.  Abdomen: Soft. Nondistended. Nontender.    Rectal: Pilonidal cyst  Musculoskeletal: Moves all extremities.  Normal range of motion.  Lymphatic: No palpable lymph nodes.  Skin: No rashes or lesions.  Psychological: Normal affect      Laboratory Values:  None      Imaging:     None      Assessment:  This is a 58 y.o. male who presents with a 2-year standing pilonidal cyst.  We discussed that given it has been symptomatic for 2 years, I do recommend surgical incision and debridement with packing.      Plan:  -- Plan for incision and debridement with packing of a pilonidal cyst      Didier Braun MD  General Surgery  Office: [phone]  Fax:     [phone]  8:37 AM   08/28/24      Vital Signs (24 Hrs):  T(C): 36.9 (09-23-20 @ 13:30), Max: 39.7 (09-23-20 @ 10:30)  HR: 75 (09-23-20 @ 16:12) (72 - 96)  BP: 99/75 (09-23-20 @ 16:12) (88/54 - 153/102)  RR: 18 (09-23-20 @ 16:12) (18 - 24)  SpO2: 100% (09-23-20 @ 16:12) (97% - 100%)  Wt(kg): --  Daily Height in cm: 165.1 (23 Sep 2020 10:30)    Daily     I&O's Summary    GENERAL: no acute distress; well-developed  HEAD:  Atraumatic, Normocephalic  EYES: EOMI, PERRLA, conjunctiva and sclera clear, + exophthalmos OD  ENT: MMM; oropharynx clear  NECK: Supple, No JVD  CHEST/LUNG: Clear to auscultation bilaterally; No wheeze  HEART: Irregular rate, normal S1S2, no murmurs, rubs, gallops  ABDOMEN: Soft, Nontender, Nondistended; Bowel sounds present  EXTREMITIES:  2+ Peripheral Pulses, No clubbing, cyanosis, or edema  PSYCH: AAOx3  NEUROLOGY: no focal motor or sensory deficits. 5/5 muscle strength in all extremities.   SKIN: chronic discoloration noted on back; no rashes.

## 2024-09-06 ENCOUNTER — APPOINTMENT (OUTPATIENT)
Dept: PRIMARY CARE | Facility: CLINIC | Age: 59
End: 2024-09-06
Payer: COMMERCIAL

## 2024-09-06 VITALS
WEIGHT: 272.2 LBS | DIASTOLIC BLOOD PRESSURE: 86 MMHG | BODY MASS INDEX: 36.92 KG/M2 | SYSTOLIC BLOOD PRESSURE: 126 MMHG | HEART RATE: 82 BPM | OXYGEN SATURATION: 95 %

## 2024-09-06 DIAGNOSIS — L91.8 CUTANEOUS SKIN TAGS: Primary | ICD-10-CM

## 2024-09-06 PROCEDURE — 17000 DESTRUCT PREMALG LESION: CPT | Performed by: FAMILY MEDICINE

## 2024-09-06 ASSESSMENT — PATIENT HEALTH QUESTIONNAIRE - PHQ9
2. FEELING DOWN, DEPRESSED OR HOPELESS: NOT AT ALL
1. LITTLE INTEREST OR PLEASURE IN DOING THINGS: NOT AT ALL
SUM OF ALL RESPONSES TO PHQ9 QUESTIONS 1 AND 2: 0

## 2024-09-06 NOTE — PROGRESS NOTES
Subjective   Patient ID: Dar Lee is a 58 y.o. male who presents for skin tags (Neck ).  HPI  Has skin tags on both sides of neck  They get caught on things  Some pain  No bleeding    Current Outpatient Medications:     albuterol 90 mcg/actuation inhaler, Inhale 2 puffs every 6 hours if needed for wheezing., Disp: , Rfl:     cholecalciferol (Vitamin D3) 25 MCG (1000 UT) tablet, Take 1 tablet (1,000 Units) by mouth once daily., Disp: , Rfl:     cyclobenzaprine (Flexeril) 5 mg tablet, Take 1-2 tablets (5-10 mg) by mouth every 8 hours if needed for muscle spasms., Disp: 60 tablet, Rfl: 2    mometasone (Nasonex) 50 mcg/actuation nasal spray, Administer into affected nostril(s)., Disp: , Rfl:     mv-min-folic-K1-lycopen-lutein (Men 50 Plus Multivitamin) 134--300 mcg tablet, Take by mouth., Disp: , Rfl:     naproxen (Naprosyn) 500 mg tablet, Take 1 tablet (500 mg) by mouth 2 times a day as needed for moderate pain (4 - 6)., Disp: 60 tablet, Rfl: 3    tadalafil (Cialis) 10 mg tablet, Take 1 tablet (10 mg) by mouth if needed for erectile dysfunction (Take 1 tablet 1 hour prior to activity as needed)., Disp: 30 tablet, Rfl: 3   Past Surgical History:   Procedure Laterality Date    HEMORRHOID SURGERY  12/19/2013    Hemorrhoidectomy    OTHER SURGICAL HISTORY  12/19/2013    Excision Of Soft Tissue Tumor Of Back Subcutaneous 3cm Or Greater      Past Medical History:   Diagnosis Date    Body mass index (BMI) 39.0-39.9, adult 01/27/2020    Body mass index (BMI) of 39.0 to 39.9 in adult    Essential (primary) hypertension 04/02/2019    Benign essential hypertension    Nondisplaced fracture of head of left radius, subsequent encounter for closed fracture with routine healing 05/26/2016    Closed nondisplaced fracture of head of left radius with routine healing    Other conditions influencing health status     Prostatic Hyperplasia Without Urinary Obstruction With Other Lower Urinary Tract Symptoms    Pain in left elbow  05/26/2016    Left elbow pain     Social History     Tobacco Use    Smoking status: Former     Types: Cigarettes    Smokeless tobacco: Never   Vaping Use    Vaping status: Never Used   Substance Use Topics    Alcohol use: Never    Drug use: Never      Family History   Problem Relation Name Age of Onset    Asthma Son        Review of Systems    Objective   /86   Pulse 82   Wt 123 kg (272 lb 3.2 oz)   SpO2 95%   BMI 36.92 kg/m²    Physical Exam  Multiple skin tags around his neck  No erythema, bleeding or discharge    Assessment/Plan   Problem List Items Addressed This Visit    None  Visit Diagnoses       Cutaneous skin tags    -  Primary        Got informed consent to remove them- clean areas with isopropyl alcohol. Patient elected no numbing injections- used forceps to raise them and scissors to clip at the bases. Bandages placed over areas. Told to gently wash the area tonight with soap and water. 13 clipped    Patient understands and agrees with treatment plan    Akshat Angel, DO

## 2024-10-10 ENCOUNTER — ANESTHESIA EVENT (OUTPATIENT)
Dept: OPERATING ROOM | Facility: HOSPITAL | Age: 59
End: 2024-10-10
Payer: COMMERCIAL

## 2024-10-15 ENCOUNTER — ANESTHESIA (OUTPATIENT)
Dept: OPERATING ROOM | Facility: HOSPITAL | Age: 59
End: 2024-10-15
Payer: COMMERCIAL

## 2024-10-15 ENCOUNTER — HOSPITAL ENCOUNTER (OUTPATIENT)
Facility: HOSPITAL | Age: 59
Setting detail: OUTPATIENT SURGERY
Discharge: HOME | End: 2024-10-15
Attending: SURGERY | Admitting: SURGERY
Payer: COMMERCIAL

## 2024-10-15 VITALS
RESPIRATION RATE: 14 BRPM | BODY MASS INDEX: 36.73 KG/M2 | TEMPERATURE: 97 F | SYSTOLIC BLOOD PRESSURE: 142 MMHG | HEIGHT: 72 IN | DIASTOLIC BLOOD PRESSURE: 78 MMHG | HEART RATE: 80 BPM | WEIGHT: 271.17 LBS | OXYGEN SATURATION: 93 %

## 2024-10-15 DIAGNOSIS — L05.91 PILONIDAL CYST: Primary | ICD-10-CM

## 2024-10-15 PROCEDURE — 2500000005 HC RX 250 GENERAL PHARMACY W/O HCPCS: Performed by: ANESTHESIOLOGY

## 2024-10-15 PROCEDURE — 11770 EXC PILONIDAL CYST SIMPLE: CPT | Performed by: SURGERY

## 2024-10-15 PROCEDURE — 3700000002 HC GENERAL ANESTHESIA TIME - EACH INCREMENTAL 1 MINUTE: Performed by: SURGERY

## 2024-10-15 PROCEDURE — 7100000009 HC PHASE TWO TIME - INITIAL BASE CHARGE: Performed by: SURGERY

## 2024-10-15 PROCEDURE — 3600000003 HC OR TIME - INITIAL BASE CHARGE - PROCEDURE LEVEL THREE: Performed by: SURGERY

## 2024-10-15 PROCEDURE — 2500000004 HC RX 250 GENERAL PHARMACY W/ HCPCS (ALT 636 FOR OP/ED): Performed by: NURSE ANESTHETIST, CERTIFIED REGISTERED

## 2024-10-15 PROCEDURE — 7100000002 HC RECOVERY ROOM TIME - EACH INCREMENTAL 1 MINUTE: Performed by: SURGERY

## 2024-10-15 PROCEDURE — 96372 THER/PROPH/DIAG INJ SC/IM: CPT | Performed by: SURGERY

## 2024-10-15 PROCEDURE — 7100000001 HC RECOVERY ROOM TIME - INITIAL BASE CHARGE: Performed by: SURGERY

## 2024-10-15 PROCEDURE — 3600000008 HC OR TIME - EACH INCREMENTAL 1 MINUTE - PROCEDURE LEVEL THREE: Performed by: SURGERY

## 2024-10-15 PROCEDURE — 2720000007 HC OR 272 NO HCPCS: Performed by: SURGERY

## 2024-10-15 PROCEDURE — 7100000010 HC PHASE TWO TIME - EACH INCREMENTAL 1 MINUTE: Performed by: SURGERY

## 2024-10-15 PROCEDURE — 3700000001 HC GENERAL ANESTHESIA TIME - INITIAL BASE CHARGE: Performed by: SURGERY

## 2024-10-15 PROCEDURE — 0752T DGTZ GLS MCRSCP SLD LVL III: CPT | Mod: TC,GEALAB | Performed by: SURGERY

## 2024-10-15 PROCEDURE — 88304 TISSUE EXAM BY PATHOLOGIST: CPT | Performed by: PATHOLOGY

## 2024-10-15 PROCEDURE — 2500000001 HC RX 250 WO HCPCS SELF ADMINISTERED DRUGS (ALT 637 FOR MEDICARE OP): Performed by: SURGERY

## 2024-10-15 PROCEDURE — 2500000004 HC RX 250 GENERAL PHARMACY W/ HCPCS (ALT 636 FOR OP/ED): Performed by: SURGERY

## 2024-10-15 RX ORDER — IBUPROFEN 600 MG/1
600 TABLET ORAL EVERY 6 HOURS
Qty: 10 TABLET | Refills: 0 | Status: SHIPPED | OUTPATIENT
Start: 2024-10-15 | End: 2024-10-18

## 2024-10-15 RX ORDER — LIDOCAINE HYDROCHLORIDE 10 MG/ML
INJECTION, SOLUTION EPIDURAL; INFILTRATION; INTRACAUDAL; PERINEURAL AS NEEDED
Status: DISCONTINUED | OUTPATIENT
Start: 2024-10-15 | End: 2024-10-15

## 2024-10-15 RX ORDER — TRAMADOL HYDROCHLORIDE 50 MG/1
50 TABLET ORAL EVERY 6 HOURS PRN
Qty: 5 TABLET | Refills: 0 | Status: SHIPPED | OUTPATIENT
Start: 2024-10-15

## 2024-10-15 RX ORDER — KETOROLAC TROMETHAMINE 30 MG/ML
INJECTION, SOLUTION INTRAMUSCULAR; INTRAVENOUS AS NEEDED
Status: DISCONTINUED | OUTPATIENT
Start: 2024-10-15 | End: 2024-10-15

## 2024-10-15 RX ORDER — ACETAMINOPHEN 325 MG/1
650 TABLET ORAL ONCE
Status: COMPLETED | OUTPATIENT
Start: 2024-10-15 | End: 2024-10-15

## 2024-10-15 RX ORDER — ONDANSETRON HYDROCHLORIDE 2 MG/ML
INJECTION, SOLUTION INTRAVENOUS AS NEEDED
Status: DISCONTINUED | OUTPATIENT
Start: 2024-10-15 | End: 2024-10-15

## 2024-10-15 RX ORDER — POLYETHYLENE GLYCOL 3350 17 G/17G
17 POWDER, FOR SOLUTION ORAL DAILY
Qty: 170 G | Refills: 0 | Status: SHIPPED | OUTPATIENT
Start: 2024-10-15 | End: 2024-10-25

## 2024-10-15 RX ORDER — SODIUM CHLORIDE, SODIUM LACTATE, POTASSIUM CHLORIDE, CALCIUM CHLORIDE 600; 310; 30; 20 MG/100ML; MG/100ML; MG/100ML; MG/100ML
INJECTION, SOLUTION INTRAVENOUS CONTINUOUS PRN
Status: DISCONTINUED | OUTPATIENT
Start: 2024-10-15 | End: 2024-10-15

## 2024-10-15 RX ORDER — ONDANSETRON HYDROCHLORIDE 2 MG/ML
8 INJECTION, SOLUTION INTRAVENOUS ONCE
Status: DISCONTINUED | OUTPATIENT
Start: 2024-10-15 | End: 2024-10-15 | Stop reason: HOSPADM

## 2024-10-15 RX ORDER — MIDAZOLAM HYDROCHLORIDE 1 MG/ML
INJECTION INTRAMUSCULAR; INTRAVENOUS AS NEEDED
Status: DISCONTINUED | OUTPATIENT
Start: 2024-10-15 | End: 2024-10-15

## 2024-10-15 RX ORDER — FENTANYL CITRATE 50 UG/ML
INJECTION, SOLUTION INTRAMUSCULAR; INTRAVENOUS AS NEEDED
Status: DISCONTINUED | OUTPATIENT
Start: 2024-10-15 | End: 2024-10-15

## 2024-10-15 RX ORDER — SUCCINYLCHOLINE CHLORIDE 20 MG/ML
INJECTION INTRAMUSCULAR; INTRAVENOUS AS NEEDED
Status: DISCONTINUED | OUTPATIENT
Start: 2024-10-15 | End: 2024-10-15

## 2024-10-15 RX ORDER — ROCURONIUM BROMIDE 10 MG/ML
INJECTION, SOLUTION INTRAVENOUS AS NEEDED
Status: DISCONTINUED | OUTPATIENT
Start: 2024-10-15 | End: 2024-10-15

## 2024-10-15 RX ORDER — PROPOFOL 10 MG/ML
INJECTION, EMULSION INTRAVENOUS AS NEEDED
Status: DISCONTINUED | OUTPATIENT
Start: 2024-10-15 | End: 2024-10-15

## 2024-10-15 RX ORDER — BUPIVACAINE HYDROCHLORIDE 5 MG/ML
INJECTION, SOLUTION PERINEURAL AS NEEDED
Status: DISCONTINUED | OUTPATIENT
Start: 2024-10-15 | End: 2024-10-15 | Stop reason: HOSPADM

## 2024-10-15 RX ORDER — ACETAMINOPHEN 325 MG/1
650 TABLET ORAL EVERY 6 HOURS
Qty: 20 TABLET | Refills: 0 | Status: SHIPPED | OUTPATIENT
Start: 2024-10-15 | End: 2024-10-18

## 2024-10-15 RX ORDER — ONDANSETRON 4 MG/1
4 TABLET, ORALLY DISINTEGRATING ORAL EVERY 6 HOURS PRN
Qty: 15 TABLET | Refills: 0 | Status: SHIPPED | OUTPATIENT
Start: 2024-10-15

## 2024-10-15 RX ORDER — GABAPENTIN 300 MG/1
300 CAPSULE ORAL ONCE
Status: COMPLETED | OUTPATIENT
Start: 2024-10-15 | End: 2024-10-15

## 2024-10-15 RX ORDER — ALBUTEROL SULFATE 0.83 MG/ML
2.5 SOLUTION RESPIRATORY (INHALATION) ONCE AS NEEDED
Status: DISCONTINUED | OUTPATIENT
Start: 2024-10-15 | End: 2024-10-15 | Stop reason: HOSPADM

## 2024-10-15 RX ORDER — ACETAMINOPHEN 325 MG/1
650 TABLET ORAL EVERY 4 HOURS PRN
Status: DISCONTINUED | OUTPATIENT
Start: 2024-10-15 | End: 2024-10-15 | Stop reason: HOSPADM

## 2024-10-15 RX ORDER — CEFAZOLIN 1 G/1
INJECTION, POWDER, FOR SOLUTION INTRAVENOUS AS NEEDED
Status: DISCONTINUED | OUTPATIENT
Start: 2024-10-15 | End: 2024-10-15

## 2024-10-15 RX ORDER — ASPIRIN 81 MG/1
81 TABLET ORAL DAILY
COMMUNITY

## 2024-10-15 RX ORDER — ENOXAPARIN SODIUM 100 MG/ML
30 INJECTION SUBCUTANEOUS ONCE
Status: COMPLETED | OUTPATIENT
Start: 2024-10-15 | End: 2024-10-15

## 2024-10-15 SDOH — HEALTH STABILITY: MENTAL HEALTH: CURRENT SMOKER: 0

## 2024-10-15 ASSESSMENT — COLUMBIA-SUICIDE SEVERITY RATING SCALE - C-SSRS
6. HAVE YOU EVER DONE ANYTHING, STARTED TO DO ANYTHING, OR PREPARED TO DO ANYTHING TO END YOUR LIFE?: NO
2. HAVE YOU ACTUALLY HAD ANY THOUGHTS OF KILLING YOURSELF?: NO
1. IN THE PAST MONTH, HAVE YOU WISHED YOU WERE DEAD OR WISHED YOU COULD GO TO SLEEP AND NOT WAKE UP?: NO

## 2024-10-15 ASSESSMENT — PAIN SCALES - GENERAL
PAINLEVEL_OUTOF10: 0 - NO PAIN
PAIN_LEVEL: 2
PAINLEVEL_OUTOF10: 0 - NO PAIN

## 2024-10-15 ASSESSMENT — PAIN - FUNCTIONAL ASSESSMENT: PAIN_FUNCTIONAL_ASSESSMENT: 0-10

## 2024-10-15 NOTE — OP NOTE
EXCISION CYST PILONIDAL     Operative Date: 10/15/24  Patient's Name: Dar Lee  Patient's YOB: 1965  Patient's MRN: [id number]  Patient's Age: 59 y.o.  Operating Room Location: [facility]  Patient's ASA: III  Patient's Estimated Blood Loss: 5 mL      PREOPERATIVE DIAGNOSIS:   Chronic pilonidal cyst        POSTOPERATIVE DIAGNOSIS:   Chronic pilonidal cyst        OPERATION/PROCEDURE:   Pilonidal cystectomy with debridement and packing        SURGEON:   Leo Braun MD.         ASSISTANT:   Scrub assist.           INDICATIONS:   This is a 59 y.o. male who presented with a chronic recurrent and a symptomatic pilonidal cyst.  We able to confirm a relatively small pilonidal cyst on physical exam.  We discussed that given its chronicity I would recommend excision.  We discussed that given that it was relatively small and that he wanted no downtime, I would recommend a simple cystectomy with debridement and packing.        OPERATION/PROCEDURE:   The reasons for, benefits to, and risks of surgery were discussed with the patient.  The risks include, but are not limited to, bleeding, infection, and recurrence.  The patient appeared to understand and consented for surgery.  He was therefore brought back to the operating room and placed on the operating room table in the prone jackknife position position.  His gluteal cleft region was prepped and draped in a standard fashion.       We started with probing the sinus tracts to find the cyst cavity.  Once we found the cyst cavity, we cut through the skin along the sinus tract and over the pilonidal cyst.  We immediately came into the cyst cavity with a chronically inflamed lining and a ball of hair.  We then excised some more of the skin over top of the cyst using electrocautery to fully unroof it.  We then found the cyst wall edge and excised it with electrocautery.  We then obtained hemostasis through electrocautery.  We  then used a curette to curette out the space.  I then cauterized the entire space.  We sent off the cyst wall the specimen.  We then packed the space with half-inch packing strips.        DISPOSITION:   The patient tolerated the procedure well.  There were no immediate complications.  He will be brought to the postanesthesia care unit. From there, he will be discharged home with outpatient followup with me in 2 to 3 weeks.      I was present and scrubbed in for the entire procedure.      CPT Codes:  47547      Operating Room Staff:  Anesthesiologist: Richie Long MD  CRNA: GM Villarreal-CRNA  Circulator: Alona Arshad RN  Scrub Person: Oliva Braun MD  General Surgery  Office: [phone]  Fax:     [phone]  9:09 AM  10/15/24

## 2024-10-15 NOTE — ANESTHESIA PROCEDURE NOTES
Airway  Date/Time: 10/15/2024 8:12 AM  Urgency: elective    Airway not difficult    Staffing  Performed: CRNA   Authorized by: Richie Long MD    Performed by: GM Villarreal-INEZ  Patient location during procedure: OR    Indications and Patient Condition  Indications for airway management: anesthesia  Spontaneous Ventilation: absent  Sedation level: deep  Preoxygenated: yes  Patient position: sniffing  MILS maintained throughout  Mask difficulty assessment: 1 - vent by mask  Planned trial extubation    Final Airway Details  Final airway type: endotracheal airway      Successful airway: ETT  Cuffed: yes   Successful intubation technique: video laryngoscopy (Krishnamurthy)  Facilitating devices/methods: intubating stylet  Endotracheal tube insertion site: oral  Blade size: #3  ETT size (mm): 7.5  Cormack-Lehane Classification: grade I - full view of glottis  Placement verified by: chest auscultation and capnometry   Cuff volume (mL): 10  Measured from: teeth  ETT to teeth (cm): 23  Number of attempts at approach: 1  Number of other approaches attempted: 0

## 2024-10-15 NOTE — DISCHARGE INSTRUCTIONS
PATIENT INSTRUCTIONS  Skin mass excision    FOLLOW-UP: Please call Dr. Braun's office at [phone] after being discharged to make a follow up appointment in 2-3 weeks. Call your physician immediately if you have any fevers greater than 103 degrees Fahrenheit, drainage from your wound that is not clear or looks infected, persistent bleeding, increasing abdominal pain,  or persistent nausea/vomiting.     WOUND CARE INSTRUCTIONS: Incisions are closed with absorbable sutures and covered with glue. Glue will fall off in about 2 weeks. If the wound becomes bright red and painful or starts to drain infected material that is not clear, please contact your physician immediately. If the wound is mildly pink and has a thick firm ridge underneath it, this is normal and is referred to as a healing ridge. This will resolve over the next 4-6 weeks. You are welcome to shower the day after surgery. Wash abdomen with warm, soapy water using your hand or gentle wash cloth. Pat dry. Do not submerge incisions in a bath tub or swimming pool for 2 weeks following surgery.    DIET: You may eat any foods that you can tolerate. We recommend following a bland, easily digestible diet for the first few days after surgery until your appetite improves. It is a good idea to eat a high fiber diet, and take in plenty of fluids to prevent constipation. Take Miralax daily if experiencing constipation after surgery until stools are a pudding like consistency and easy to pass.     ACTIVITY: You are encouraged to cough and take deep breaths or use the incentive spirometry that was provided. This will help prevent respiratory complications and low grade fevers post-operatively. You may want to hug a pillow when coughing and sneezing to add additional support to the surgical area which will decrease pain during these times. There are no specific lifting / activity restrictions, but if you feel the skin pulling, adjust your activity accordingly. We  encourage frequent ambulation and getting out of bed as much as possible while you recover to improve your recovery process.     MEDICATIONS: Plan to take Tylenol and Ibuprofen (if your physician approves) every 6 hours for the first week after surgery. Alternatively, you can alternate these two medications every three hours for the first week. You will be provided a short course of a narcotic medication to take for breakthrough pain as needed. You should not drive, make important decisions, or operate machinery when taking narcotic pain medication.    QUESTIONS: Please feel free to call Dr. Braun's office for any questions or concerns following surgery. Our office number is [phone].

## 2024-10-15 NOTE — ANESTHESIA POSTPROCEDURE EVALUATION
Patient: Dar Lee    Procedure Summary       Date: 10/15/24 Room / Location: [facility] OR 08 / [facility]    Anesthesia Start: 0752 Anesthesia Stop: 0901    Procedure: EXCISION CYST PILONIDAL Diagnosis:       Pilonidal cyst      (Pilonidal cyst [L05.91])    Surgeons: Leo Braun MD Responsible Provider: Richie Long MD    Anesthesia Type: general ASA Status: 3     Vitals:    10/15/24 0941   BP: 142/78   Pulse: 80   Resp: 14   Temp:    SpO2: 93%       Past Surgical History:   Procedure Laterality Date    COLONOSCOPY W/ POLYPECTOMY  03/2023    HEMORRHOID SURGERY  12/19/2013    Hemorrhoidectomy    OTHER SURGICAL HISTORY  12/19/2013    Excision Of Soft Tissue Tumor Of Back Subcutaneous 3cm Or Greater     Past Medical History:   Diagnosis Date    Body mass index (BMI) 36.0-36.9, adult 10/16/2023    Body mass index (BMI) 39.0-39.9, adult 01/27/2020    BPH (benign prostatic hyperplasia)     Colon polyp 03/2023    Essential (primary) hypertension     Nondisplaced fracture of head of left radius, subsequent encounter for closed fracture with routine healing 05/26/2016    Closed nondisplaced fracture of head of left radius with routine healing    Pain in left elbow 05/26/2016    Left elbow pain    Pilonidal cyst 08/28/2024     No current facility-administered medications for this encounter.    Current Outpatient Medications:     albuterol 90 mcg/actuation inhaler, Inhale 2 puffs every 6 hours if needed for wheezing., Disp: , Rfl:     aspirin 81 mg EC tablet, Take 1 tablet (81 mg) by mouth once daily., Disp: , Rfl:     cholecalciferol (Vitamin D3) 25 MCG (1000 UT) tablet, Take 1 tablet (1,000 Units) by mouth once daily., Disp: , Rfl:     cyclobenzaprine (Flexeril) 5 mg tablet, Take 1-2 tablets (5-10 mg) by mouth every 8 hours if needed for muscle spasms., Disp: 60 tablet, Rfl: 2    mometasone (Nasonex) 50 mcg/actuation nasal spray, Administer into affected nostril(s)., Disp: , Rfl:      mv-min-folic-K1-lycopen-lutein (Men 50 Plus Multivitamin) 232--300 mcg tablet, Take by mouth., Disp: , Rfl:     naproxen (Naprosyn) 500 mg tablet, Take 1 tablet (500 mg) by mouth 2 times a day as needed for moderate pain (4 - 6)., Disp: 60 tablet, Rfl: 3    tadalafil (Cialis) 10 mg tablet, Take 1 tablet (10 mg) by mouth if needed for erectile dysfunction (Take 1 tablet 1 hour prior to activity as needed)., Disp: 30 tablet, Rfl: 3    acetaminophen (Tylenol) 325 mg tablet, Take 2 tablets (650 mg) by mouth every 6 hours for 10 doses., Disp: 20 tablet, Rfl: 0    ibuprofen 600 mg tablet, Take 1 tablet (600 mg) by mouth every 6 hours for 10 doses., Disp: 10 tablet, Rfl: 0    ondansetron ODT (Zofran-ODT) 4 mg disintegrating tablet, Take 1 tablet (4 mg) by mouth every 6 hours if needed for nausea or vomiting for up to 15 doses., Disp: 15 tablet, Rfl: 0    polyethylene glycol (Glycolax, Miralax) 17 gram/dose powder, Mix 17 g of powder and drink once daily for 10 days., Disp: 170 g, Rfl: 0    traMADol (Ultram) 50 mg tablet, Take 1 tablet (50 mg) by mouth every 6 hours if needed for severe pain (7 - 10) for up to 5 doses., Disp: 5 tablet, Rfl: 0  Prior to Admission medications    Medication Sig Start Date End Date Taking? Authorizing Provider   albuterol 90 mcg/actuation inhaler Inhale 2 puffs every 6 hours if needed for wheezing.   Yes Historical Provider, MD   aspirin 81 mg EC tablet Take 1 tablet (81 mg) by mouth once daily.   Yes Historical Provider, MD   cholecalciferol (Vitamin D3) 25 MCG (1000 UT) tablet Take 1 tablet (1,000 Units) by mouth once daily.   Yes Historical Provider, MD   cyclobenzaprine (Flexeril) 5 mg tablet Take 1-2 tablets (5-10 mg) by mouth every 8 hours if needed for muscle spasms. 11/16/23  Yes Rachelle Whittington MD   mometasone (Nasonex) 50 mcg/actuation nasal spray Administer into affected nostril(s).   Yes Historical Provider, MD rodas-min-folic-K1-lycopen-lutein (Men 50 Plus  Multivitamin) 376--300 mcg tablet Take by mouth. 4/2/19  Yes Rayn Aly MD   naproxen (Naprosyn) 500 mg tablet Take 1 tablet (500 mg) by mouth 2 times a day as needed for moderate pain (4 - 6). 11/16/23  Yes Rachelle Whittington MD   tadalafil (Cialis) 10 mg tablet Take 1 tablet (10 mg) by mouth if needed for erectile dysfunction (Take 1 tablet 1 hour prior to activity as needed). 10/16/23 10/15/24 Yes Jennifer Saldana MD   acetaminophen (Tylenol) 325 mg tablet Take 2 tablets (650 mg) by mouth every 6 hours for 10 doses. 10/15/24 10/18/24  Leo Braun MD   ibuprofen 600 mg tablet Take 1 tablet (600 mg) by mouth every 6 hours for 10 doses. 10/15/24 10/18/24  Leo Braun MD   ondansetron ODT (Zofran-ODT) 4 mg disintegrating tablet Take 1 tablet (4 mg) by mouth every 6 hours if needed for nausea or vomiting for up to 15 doses. 10/15/24   Leo Braun MD   polyethylene glycol (Glycolax, Miralax) 17 gram/dose powder Mix 17 g of powder and drink once daily for 10 days. 10/15/24 10/25/24  Leo Braun MD   traMADol (Ultram) 50 mg tablet Take 1 tablet (50 mg) by mouth every 6 hours if needed for severe pain (7 - 10) for up to 5 doses. 10/15/24   Leo Braun MD     No Known Allergies  Social History     Tobacco Use    Smoking status: Former     Types: Cigarettes    Smokeless tobacco: Never   Substance Use Topics    Alcohol use: Never         Chemistry    Lab Results   Component Value Date/Time     11/16/2023 1406    K 4.3 11/16/2023 1406     11/16/2023 1406    CO2 31 11/16/2023 1406    BUN 19 11/16/2023 1406    CREATININE 0.75 11/16/2023 1406    Lab Results   Component Value Date/Time    CALCIUM 9.7 11/16/2023 1406    ALKPHOS 66 11/16/2023 1406    AST 24 11/16/2023 1406    ALT 39 11/16/2023 1406    BILITOT 0.4 11/16/2023 1406          Lab Results   Component Value Date/Time    WBC 5.6 11/16/2023 1406    HGB 14.7 11/16/2023 1406    HCT 45.6 11/16/2023  1406     11/16/2023 1406     Lab Results   Component Value Date/Time    PROTIME 14.3 (H) 04/06/2021 1107    INR 1.2 (H) 04/06/2021 1107     No results found for this or any previous visit (from the past 4464 hour(s)).  No results found for this or any previous visit from the past 1095 days.        Anesthesia Type: general    Vitals Value Taken Time   /97 10/15/24 0926   Temp 36.1 °C (97 °F) 10/15/24 0856   Pulse 88 10/15/24 0926   Resp 18 10/15/24 0926   SpO2 95 % 10/15/24 0926       Anesthesia Post Evaluation    Patient location during evaluation: PACU  Patient participation: complete - patient participated  Level of consciousness: awake  Pain score: 2  Pain management: adequate  Multimodal analgesia pain management approach  Airway patency: patent  Two or more strategies used to mitigate risk of obstructive sleep apnea  Cardiovascular status: acceptable  Respiratory status: acceptable  Hydration status: acceptable  Postoperative Nausea and Vomiting: none        No notable events documented.

## 2024-10-15 NOTE — H&P
History Of Present Illness  Dar Lee is a 59 y.o. male presenting with pilonidal cyst.     Past Medical History  Past Medical History:   Diagnosis Date    Body mass index (BMI) 36.0-36.9, adult 10/16/2023    Body mass index (BMI) 39.0-39.9, adult 01/27/2020    BPH (benign prostatic hyperplasia)     Colon polyp 03/2023    Essential (primary) hypertension     Nondisplaced fracture of head of left radius, subsequent encounter for closed fracture with routine healing 05/26/2016    Closed nondisplaced fracture of head of left radius with routine healing    Pain in left elbow 05/26/2016    Left elbow pain    Pilonidal cyst 08/28/2024       Surgical History  Past Surgical History:   Procedure Laterality Date    COLONOSCOPY W/ POLYPECTOMY  03/2023    HEMORRHOID SURGERY  12/19/2013    Hemorrhoidectomy    OTHER SURGICAL HISTORY  12/19/2013    Excision Of Soft Tissue Tumor Of Back Subcutaneous 3cm Or Greater        Social History  He reports that he has quit smoking. His smoking use included cigarettes. He has never used smokeless tobacco. He reports that he does not drink alcohol and does not use drugs.    Family History  Family History   Problem Relation Name Age of Onset    Asthma Son          Allergies  Patient has no known allergies.    Review of Systems     Physical Exam     Last Recorded Vitals  Blood pressure (!) 186/96, pulse 92, temperature 36.7 °C (98.1 °F), resp. rate 18, height 1.829 m (6'), weight 123 kg (271 lb 2.7 oz), SpO2 97%.    Relevant Results        pilonidal cyst     Assessment/Plan   Assessment & Plan  Pilonidal cyst      pilonidal cyst       I spent 5 minutes in the professional and overall care of this patient.      Leo Braun MD

## 2024-10-15 NOTE — ANESTHESIA PREPROCEDURE EVALUATION
Patient: Dar Lee    Procedure Information       Date/Time: 10/15/24 0745    Procedure: EXCISION CYST PILONIDAL - incision and debridement with packing of a pilonidal cyst    Location: [facility] / [facility]    Surgeons: Leo rBaun MD     Vitals:    10/15/24 0638   BP: (!) 186/96   Pulse: 92   Resp: 18   Temp: 36.7 °C (98.1 °F)   SpO2: 97%       Past Surgical History:   Procedure Laterality Date   • COLONOSCOPY W/ POLYPECTOMY  03/2023   • HEMORRHOID SURGERY  12/19/2013    Hemorrhoidectomy   • OTHER SURGICAL HISTORY  12/19/2013    Excision Of Soft Tissue Tumor Of Back Subcutaneous 3cm Or Greater     Past Medical History:   Diagnosis Date   • Body mass index (BMI) 36.0-36.9, adult 10/16/2023   • Body mass index (BMI) 39.0-39.9, adult 01/27/2020   • BPH (benign prostatic hyperplasia)    • Colon polyp 03/2023   • Essential (primary) hypertension    • Nondisplaced fracture of head of left radius, subsequent encounter for closed fracture with routine healing 05/26/2016    Closed nondisplaced fracture of head of left radius with routine healing   • Pain in left elbow 05/26/2016    Left elbow pain   • Pilonidal cyst 08/28/2024     No current facility-administered medications for this encounter.  Prior to Admission medications    Medication Sig Start Date End Date Taking? Authorizing Provider   albuterol 90 mcg/actuation inhaler Inhale 2 puffs every 6 hours if needed for wheezing.   Yes Historical Provider, MD   aspirin 81 mg EC tablet Take 1 tablet (81 mg) by mouth once daily.   Yes Historical Provider, MD   cholecalciferol (Vitamin D3) 25 MCG (1000 UT) tablet Take 1 tablet (1,000 Units) by mouth once daily.   Yes Historical Provider, MD   cyclobenzaprine (Flexeril) 5 mg tablet Take 1-2 tablets (5-10 mg) by mouth every 8 hours if needed for muscle spasms. 11/16/23  Yes Rachelle Whittington MD   mometasone (Nasonex) 50 mcg/actuation nasal spray Administer into affected nostril(s).   Yes Historical  Provider, MD   mv-min-folic-K1-lycopen-lutein (Men 50 Plus Multivitamin) 904--300 mcg tablet Take by mouth. 4/2/19  Yes Historical Provider, MD   naproxen (Naprosyn) 500 mg tablet Take 1 tablet (500 mg) by mouth 2 times a day as needed for moderate pain (4 - 6). 11/16/23  Yes Rachelle Whittington MD   tadalafil (Cialis) 10 mg tablet Take 1 tablet (10 mg) by mouth if needed for erectile dysfunction (Take 1 tablet 1 hour prior to activity as needed). 10/16/23 10/15/24 Yes Jennifer Saldana MD     No Known Allergies  Social History     Tobacco Use   • Smoking status: Former     Types: Cigarettes   • Smokeless tobacco: Never   Substance Use Topics   • Alcohol use: Never         Chemistry    Lab Results   Component Value Date/Time     11/16/2023 1406    K 4.3 11/16/2023 1406     11/16/2023 1406    CO2 31 11/16/2023 1406    BUN 19 11/16/2023 1406    CREATININE 0.75 11/16/2023 1406    Lab Results   Component Value Date/Time    CALCIUM 9.7 11/16/2023 1406    ALKPHOS 66 11/16/2023 1406    AST 24 11/16/2023 1406    ALT 39 11/16/2023 1406    BILITOT 0.4 11/16/2023 1406          Lab Results   Component Value Date/Time    WBC 5.6 11/16/2023 1406    HGB 14.7 11/16/2023 1406    HCT 45.6 11/16/2023 1406     11/16/2023 1406     Lab Results   Component Value Date/Time    PROTIME 14.3 (H) 04/06/2021 1107    INR 1.2 (H) 04/06/2021 1107     No results found for this or any previous visit (from the past 4464 hour(s)).  No results found for this or any previous visit from the past 1095 days.        Relevant Problems   Pulmonary   (+) Mild intermittent asthma without complication (HHS-HCC)      Neuro   (+) Lumbar radiculopathy      GI   (+) Irritable bowel syndrome with diarrhea      /Renal   (+) BPH (benign prostatic hyperplasia)       Clinical information reviewed:   Tobacco  Allergies  Meds  Problems  Med Hx  Surg Hx   Fam Hx  Soc   Hx        NPO Detail:  NPO/Void Status  Date of Last Liquid:  10/14/24  Time of Last Liquid: 2130  Date of Last Solid: 10/14/24  Time of Last Solid: 1700         Physical Exam    Airway  Mallampati: II  TM distance: >3 FB     Cardiovascular   Rhythm: regular  Rate: normal     Dental    Pulmonary   Breath sounds clear to auscultation     Abdominal          Anesthesia Plan    History of general anesthesia?: yes  History of complications of general anesthesia?: no    ASA 3     general     The patient is not a current smoker.    intravenous induction   Anesthetic plan and risks discussed with patient.    Plan discussed with CRNA and attending.     N/A

## 2024-10-22 LAB
LABORATORY COMMENT REPORT: NORMAL
PATH REPORT.FINAL DX SPEC: NORMAL
PATH REPORT.GROSS SPEC: NORMAL
PATH REPORT.RELEVANT HX SPEC: NORMAL
PATH REPORT.TOTAL CANCER: NORMAL

## 2024-11-06 ENCOUNTER — APPOINTMENT (OUTPATIENT)
Dept: SURGERY | Facility: CLINIC | Age: 59
End: 2024-11-06
Payer: COMMERCIAL

## 2024-11-06 VITALS — DIASTOLIC BLOOD PRESSURE: 99 MMHG | HEART RATE: 100 BPM | OXYGEN SATURATION: 96 % | SYSTOLIC BLOOD PRESSURE: 142 MMHG

## 2024-11-06 DIAGNOSIS — Z09 POSTOPERATIVE EXAMINATION: Primary | ICD-10-CM

## 2024-11-06 PROCEDURE — 99024 POSTOP FOLLOW-UP VISIT: CPT | Performed by: PHYSICIAN ASSISTANT

## 2024-11-07 NOTE — PROGRESS NOTES
General Surgery Post Operative Follow Up     Subjective   Dar Lee presents to the clinic 3 weeks following a pilonidal cystectomy with debridement. Doing well, pain has been minimal. His wife helps with packing changes daily and notices the wound getting smaller. Patient reports minimal drainage, states it was bleeding with packing changes although this has improved.     Objective   BP (!) 142/99   Pulse 100   SpO2 96%     Physical Exam  Constitutional: No acute distress, sitting up in bed.  Neuro: Alert, oriented. Follows commands.   Eyes: EOMI. No scleral icterus. Conjunctiva pink.  ENT: MMM.   Heart: Regular rate.  Respiratory: No increased work of breathing or audible wheeze.  Abdomen: Soft, nondistended.   MSK: Moves all extremities.  Vascular: Palpable pulses throughout, no pitting edema.  Skin: Pilonidal cyst excision wound with healthy granulation tissue at base, tunnels 1cm proximally. Opening 1.5x1.5cm.  Psychological: Appropriate mood and behavior.       Assessment/Plan   This is a 59 y.o. year old male who presents for a post operative follow up 3 weeks following a pilonidal cystectomy with debridement. Doing well post operatively, continues packing changes daily.    Plan:  -- Lifting restrictions: Return to strenuous activity gradually and as tolerated  -- Continue with packing changes until tunneled portion is no longer tunneling, then place gauze 4x4 over exposed tissue until scabbed over  -- Regular diet  -- Follow up in 3-4 weeks or sooner as needed    Discussed with Dr. Braun who is in agreement with plan.     Anitha Gray PA-C

## 2024-12-04 ENCOUNTER — APPOINTMENT (OUTPATIENT)
Dept: SURGERY | Facility: CLINIC | Age: 59
End: 2024-12-04
Payer: COMMERCIAL

## 2024-12-04 DIAGNOSIS — Z09 POSTOPERATIVE EXAMINATION: Primary | ICD-10-CM

## 2024-12-04 PROCEDURE — 99024 POSTOP FOLLOW-UP VISIT: CPT | Performed by: PHYSICIAN ASSISTANT

## 2024-12-04 PROCEDURE — 1036F TOBACCO NON-USER: CPT | Performed by: PHYSICIAN ASSISTANT

## 2024-12-06 NOTE — PROGRESS NOTES
General Surgery Post Operative Follow Up     Subjective   Dar Lee presents to the clinic for a wound check following a pilonidal cyst excision.  Wife has been changing packing daily at home and noticed significant improvement of the size of the wound.  There is still a minimal amount of serous drainage and a 1 cm area of moist, beefy red tissue that he has been covering with gauze and a Band-Aid.  He noticed the depth of the wound increased significantly a few days after his last visit.  Denies pain, fevers, chills.  Happy with the recovery process thus far.    Objective   There were no vitals taken for this visit.    Physical Exam  Constitutional: No acute distress, sitting up in bed.  Neuro: Alert, oriented. Follows commands.   Eyes: EOMI. No scleral icterus. Conjunctiva pink.  ENT: MMM.   Heart: Regular rate.  Respiratory: No increased work of breathing or audible wheeze.  Abdomen: Soft, nondistended.  MSK: Moves all extremities.  Vascular: Palpable pulses throughout, no pitting edema.  Skin: Gluteal cleft with a 1 cm x 1 cm area of granulation tissue, beefy red and flush with skin.  No surrounding skin changes.  Psychological: Appropriate mood and behavior.       Assessment/Plan   This is a 59 y.o. year old male who presents for a post operative follow up following a pilonidal cyst excision with debridement.  He is now covering the wound with a piece of gauze and Band-Aid.  The wound has filled in nicely and is now flush to surrounding skin with healthy granulation tissue.    Plan:  -- Continue to cover wound with gauze and Band-Aid until it is dry  --Change this gauze daily  --Once it is scabbed over and resembles more normal-looking skin, may leave open to air  --Follow-up as needed  --Call if wound does not continue to heal or notices new drainage, warmth, pain, fevers    Discussed with Dr. Braun who is in agreement with plan.     Anitha Gray PA-C

## 2024-12-13 ENCOUNTER — HOSPITAL ENCOUNTER (OUTPATIENT)
Dept: RADIOLOGY | Facility: HOSPITAL | Age: 59
Discharge: HOME | End: 2024-12-13
Payer: COMMERCIAL

## 2024-12-13 DIAGNOSIS — R93.1 AGATSTON CORONARY ARTERY CALCIUM SCORE BETWEEN 200 AND 399: ICD-10-CM

## 2024-12-13 PROCEDURE — 75571 CT HRT W/O DYE W/CA TEST: CPT

## 2024-12-20 DIAGNOSIS — R93.1 AGATSTON CORONARY ARTERY CALCIUM SCORE GREATER THAN 400: Primary | ICD-10-CM

## (undated) DEVICE — SUTURE, VICRYL, 3-0, 27 IN, CT-2, UNDYED

## (undated) DEVICE — DRESSING, NON-ADHERENT, TELFA, OUCHLESS, 3 X 8 IN, STERILE

## (undated) DEVICE — DRESSING, GAUZE, DRAIN SPONGE, 6 PLY, EXCILON, 4 X 4 IN, STERILE

## (undated) DEVICE — DRAPE, SHEET, UTILITY, NON ABSORBENT, 18 X 26 IN, LF

## (undated) DEVICE — DRAPE PACK, MINOR, CUSTOM, GEAUGA

## (undated) DEVICE — DRAPE, SHEET, THYROID, W/ARMBOARD COVER, 100 X 121 IN, DISPOSABLE, LF, STERILE

## (undated) DEVICE — CAUTERY, PENCIL, PUSH BUTTON, SMOKE EVAC, 70MM

## (undated) DEVICE — SUTURE, VICRYL, 4-0, 18 IN, PS2, UNDYED

## (undated) DEVICE — ELECTRODE, ELECTROSURGICAL, BLADE, INSULATED, ENT/IMA, STERILE

## (undated) DEVICE — PREP TRAY, SKIN, DRY, W/GLOVES

## (undated) DEVICE — SOLUTION, IRRIGATION, SODIUM CHLORIDE 0.9%, 1000 ML, POUR BOTTLE